# Patient Record
Sex: MALE | Race: WHITE | Employment: FULL TIME | URBAN - METROPOLITAN AREA
[De-identification: names, ages, dates, MRNs, and addresses within clinical notes are randomized per-mention and may not be internally consistent; named-entity substitution may affect disease eponyms.]

---

## 2020-05-04 ENCOUNTER — ANCILLARY PROCEDURE (OUTPATIENT)
Dept: GENERAL RADIOLOGY | Facility: CLINIC | Age: 30
End: 2020-05-04
Attending: PEDIATRICS

## 2020-05-04 ENCOUNTER — OFFICE VISIT (OUTPATIENT)
Dept: ORTHOPEDICS | Facility: CLINIC | Age: 30
End: 2020-05-04

## 2020-05-04 VITALS
HEIGHT: 76 IN | BODY MASS INDEX: 26.79 KG/M2 | WEIGHT: 220 LBS | DIASTOLIC BLOOD PRESSURE: 84 MMHG | SYSTOLIC BLOOD PRESSURE: 118 MMHG

## 2020-05-04 DIAGNOSIS — S52.612A CLOSED DISPLACED FRACTURE OF STYLOID PROCESS OF LEFT ULNA, INITIAL ENCOUNTER: ICD-10-CM

## 2020-05-04 DIAGNOSIS — S69.92XA INJURY OF LEFT WRIST, INITIAL ENCOUNTER: ICD-10-CM

## 2020-05-04 DIAGNOSIS — S69.92XA INJURY OF LEFT WRIST, INITIAL ENCOUNTER: Primary | ICD-10-CM

## 2020-05-04 PROCEDURE — 99203 OFFICE O/P NEW LOW 30 MIN: CPT | Performed by: PEDIATRICS

## 2020-05-04 PROCEDURE — 73110 X-RAY EXAM OF WRIST: CPT | Mod: LT

## 2020-05-04 ASSESSMENT — MIFFLIN-ST. JEOR: SCORE: 2059.41

## 2020-05-04 NOTE — PATIENT INSTRUCTIONS
Icing, over the counter medication, and elevate as needed for comfort  Routine use of support discussed; will use brace  Letter for work  Plan on recheck in approx 4 weeks, sooner if needed  Contact clinic if any questions on insurance coverage; we can consider a care coordination referral

## 2020-05-04 NOTE — PROGRESS NOTES
Sports Medicine Clinic Visit    PCP: No Ref-Primary, Physician    Real Hendricks is a 29 year old male who is seen  as a self referral presenting with a left wrist injury.  While using a wheelbarrow, he jammed his wrist causing it to forcefully ulnarly deviate. 4/29/20  He has continued to have pain down the ulnar aspect of his wrist.   He is right hand dominant.        Injury: forceful ulnar deviation   **  Recently started new job.      Location of Pain: left wrist   Duration of Pain: 5 day(s)  Rating of Pain at worst: 8/10  Rating of Pain Currently: 4/10  Symptoms are better with: Ice and Elevation, compression   Symptoms are worse with: flexion, pronation/supination   Additional Features:   Positive: swelling and popping   Negative: bruising, grinding, catching, locking, instability, paresthesias, numbness, weakness, pain in other joints and systemic symptoms  Other evaluation and/or treatments so far consists of: Nothing  Prior History of related problems: denies     Social History: concrete     Review of Systems  Musculoskeletal: as above  Remainder of review of systems is negative including constitutional, CV, pulmonary, GI, Skin and Neurologic except as noted in HPI or medical history.      PMHx: noncontributory    Past Surgical History:   Procedure Laterality Date     HERNIA REPAIR       Family History   Problem Relation Age of Onset     Allergies Mother      Allergies Father      Osteoarthritis Father      Social History     Socioeconomic History     Marital status: Single     Spouse name: Not on file     Number of children: Not on file     Years of education: Not on file     Highest education level: Not on file   Occupational History     Not on file   Social Needs     Financial resource strain: Not on file     Food insecurity     Worry: Not on file     Inability: Not on file     Transportation needs     Medical: Not on file     Non-medical: Not on file   Tobacco Use     Smoking status: Current Every Day  "Smoker     Smokeless tobacco: Never Used   Substance and Sexual Activity     Alcohol use: Not on file     Drug use: Not on file     Sexual activity: Not on file   Lifestyle     Physical activity     Days per week: Not on file     Minutes per session: Not on file     Stress: Not on file   Relationships     Social connections     Talks on phone: Not on file     Gets together: Not on file     Attends Mandaeism service: Not on file     Active member of club or organization: Not on file     Attends meetings of clubs or organizations: Not on file     Relationship status: Not on file     Intimate partner violence     Fear of current or ex partner: Not on file     Emotionally abused: Not on file     Physically abused: Not on file     Forced sexual activity: Not on file   Other Topics Concern     Not on file   Social History Narrative     Not on file       Objective  /84   Ht 1.93 m (6' 4\")   Wt 99.8 kg (220 lb)   BMI 26.78 kg/m      GENERAL APPEARANCE: healthy, alert and no distress   GAIT: NORMAL  SKIN: no suspicious lesions or rashes  NEURO: Normal strength and tone, mentation intact and speech normal  PSYCH:  mentation appears normal and affect normal/bright  HEENT: no scleral icterus  CV: distal perfusion intact  RESP: nonlabored breathing    Exam:  Hand/wrist (left):    Inspection:  No deformity noted.  Ulnar wrist swelling. No ecchymosis.    Motion:  Forearm pronation full, forearm supination mild limtiation.  Wrist flexion mild limitation  Wrist extension mild limitation  Radial and ulnar deviation min limitation with stiffness  Digit motion full  Primarily some tightness with wrist motion    Strength:  deferred    Sensation:  Grossly intact .    Radial pulses normal, +2/4, capillary refill brisk.    Palpation:  Tender ulnar styloid  Nontender remainder    Skin:       well perfused       capillary refill brisk    Radiology:  Visualized radiographs of left wrist obtained today, and reviewed the images with " the patient.  Impression: ulnar styloid fracture acute, with chronic appearing ossicle.    XR Wrist Left G/E 3 Views    Narrative    WRIST LEFT THREE OR MORE VIEWS May 4, 2020 11:09 AM     HISTORY: Injury of left wrist, initial encounter.      Impression    IMPRESSION: Unusual acute or subacute fracture along the medial margin  of the ulnar styloid base. Small well-corticated ossicle adjacent to  the tip of the ulnar styloid compatible with old injury. The distal  radius appears intact. Radiocarpal and midcarpal joint space widths  appear within normal limits.    ROMMEL ARELLANO MD           Assessment:  1. Injury of left wrist, initial encounter    2. Closed displaced fracture of styloid process of left ulna, initial encounter         Plan:  Discussed the assessment with the patient.  Discussed nature of injury, fracture healing. Interesting appearing acute fracture, with chronic appearing ossicle.  Icing, elevation, OTC meds prn.  Discussed support options, including bracing, casting, custom splint, less likely fracture brace for support. Following discussion of options, he does not want to do casting or custom splint. Also is self pay currently. Will provide wrist brace, anticipate regular use.  Limit activities for pain. Letter provided for work.  Follow up: 4 weeks, repeat x-ray wrist, sooner prn.  Questions answered. The patient indicates understanding of these issues and agrees with the plan.    Stan Varghese, DO, CAQ          Patient Instructions   Icing, over the counter medication, and elevate as needed for comfort  Routine use of support discussed; will use brace  Letter for work  Plan on recheck in approx 4 weeks, sooner if needed  Contact clinic if any questions on insurance coverage; we can consider a care coordination referral       Disclaimer: This note consists of symbols derived from keyboarding, dictation and/or voice recognition software. As a result, there may be errors in the script that  have gone undetected. Please consider this when interpreting information found in this chart.

## 2020-05-04 NOTE — LETTER
[facility] [place]  [address]  [place] [address]  Phone: [phone]  Fax: [phone]      May 4, 2020      RE: Real Hendricks  [address]  [place] [address]        To whom it may concern:    Real Hendricks was seen in clinic today. The employee is ABLE to return to work next scheduled work date.    When the patient returns to work, the following restrictions apply for the next 4 weeks:  Reach Above Shoulders: Not at all (0 hours) left  Lift, carry, push, and pull: avoid independent motion on left; but may do up to 5 lbs left occasionally (1-3 hours) if needed and symptom free.  Keep work close to body with left upper extremity.      Sincerely,            Stan COPELAND.

## 2020-05-04 NOTE — LETTER
5/4/2020         RE: Real Hendricks  [address]  [place] [address]        Dear Colleague,    Thank you for referring your patient, Real Hendricks, to the [facility] [place]. Please see a copy of my visit note below.    Sports Medicine Clinic Visit    PCP: No Ref-Primary, Physician    Real Hendricks is a 29 year old male who is seen  as a self referral presenting with a left wrist injury.  While using a wheelbarrow, he jammed his wrist causing it to forcefully ulnarly deviate. 4/29/20  He has continued to have pain down the ulnar aspect of his wrist.   He is right hand dominant.        Injury: forceful ulnar deviation   **  Recently started new job.      Location of Pain: left wrist   Duration of Pain: 5 day(s)  Rating of Pain at worst: 8/10  Rating of Pain Currently: 4/10  Symptoms are better with: Ice and Elevation, compression   Symptoms are worse with: flexion, pronation/supination   Additional Features:   Positive: swelling and popping   Negative: bruising, grinding, catching, locking, instability, paresthesias, numbness, weakness, pain in other joints and systemic symptoms  Other evaluation and/or treatments so far consists of: Nothing  Prior History of related problems: denies     Social History: concrete     Review of Systems  Musculoskeletal: as above  Remainder of review of systems is negative including constitutional, CV, pulmonary, GI, Skin and Neurologic except as noted in HPI or medical history.      PMHx: noncontributory    Past Surgical History:   Procedure Laterality Date     HERNIA REPAIR       Family History   Problem Relation Age of Onset     Allergies Mother      Allergies Father      Osteoarthritis Father      Social History     Socioeconomic History     Marital status: Single     Spouse name: Not on file     Number of children: Not on file     Years of education: Not on file     Highest education level: Not on file   Occupational History     Not on file   Social  "Needs     Financial resource strain: Not on file     Food insecurity     Worry: Not on file     Inability: Not on file     Transportation needs     Medical: Not on file     Non-medical: Not on file   Tobacco Use     Smoking status: Current Every Day Smoker     Smokeless tobacco: Never Used   Substance and Sexual Activity     Alcohol use: Not on file     Drug use: Not on file     Sexual activity: Not on file   Lifestyle     Physical activity     Days per week: Not on file     Minutes per session: Not on file     Stress: Not on file   Relationships     Social connections     Talks on phone: Not on file     Gets together: Not on file     Attends Sabianism service: Not on file     Active member of club or organization: Not on file     Attends meetings of clubs or organizations: Not on file     Relationship status: Not on file     Intimate partner violence     Fear of current or ex partner: Not on file     Emotionally abused: Not on file     Physically abused: Not on file     Forced sexual activity: Not on file   Other Topics Concern     Not on file   Social History Narrative     Not on file       Objective  /84   Ht 1.93 m (6' 4\")   Wt 99.8 kg (220 lb)   BMI 26.78 kg/m      GENERAL APPEARANCE: healthy, alert and no distress   GAIT: NORMAL  SKIN: no suspicious lesions or rashes  NEURO: Normal strength and tone, mentation intact and speech normal  PSYCH:  mentation appears normal and affect normal/bright  HEENT: no scleral icterus  CV: distal perfusion intact  RESP: nonlabored breathing    Exam:  Hand/wrist (left):    Inspection:  No deformity noted.  Ulnar wrist swelling. No ecchymosis.    Motion:  Forearm pronation full, forearm supination mild limtiation.  Wrist flexion mild limitation  Wrist extension mild limitation  Radial and ulnar deviation min limitation with stiffness  Digit motion full  Primarily some tightness with wrist motion    Strength:  deferred    Sensation:  Grossly intact .    Radial pulses " normal, +2/4, capillary refill brisk.    Palpation:  Tender ulnar styloid  Nontender remainder    Skin:       well perfused       capillary refill brisk    Radiology:  Visualized radiographs of left wrist obtained today, and reviewed the images with the patient.  Impression: ulnar styloid fracture acute, with chronic appearing ossicle.    XR Wrist Left G/E 3 Views    Narrative    WRIST LEFT THREE OR MORE VIEWS May 4, 2020 11:09 AM     HISTORY: Injury of left wrist, initial encounter.      Impression    IMPRESSION: Unusual acute or subacute fracture along the medial margin  of the ulnar styloid base. Small well-corticated ossicle adjacent to  the tip of the ulnar styloid compatible with old injury. The distal  radius appears intact. Radiocarpal and midcarpal joint space widths  appear within normal limits.    ROMMEL ARELLANO MD           Assessment:  1. Injury of left wrist, initial encounter    2. Closed displaced fracture of styloid process of left ulna, initial encounter         Plan:  Discussed the assessment with the patient.  Discussed nature of injury, fracture healing. Interesting appearing acute fracture, with chronic appearing ossicle.  Icing, elevation, OTC meds prn.  Discussed support options, including bracing, casting, custom splint, less likely fracture brace for support. Following discussion of options, he does not want to do casting or custom splint. Also is self pay currently. Will provide wrist brace, anticipate regular use.  Limit activities for pain. Letter provided for work.  Follow up: 4 weeks, repeat x-ray wrist, sooner prn.  Questions answered. The patient indicates understanding of these issues and agrees with the plan.    Stan Varghese, DO, CAQ          Patient Instructions   Icing, over the counter medication, and elevate as needed for comfort  Routine use of support discussed; will use brace  Letter for work  Plan on recheck in approx 4 weeks, sooner if needed  Contact clinic if any  questions on insurance coverage; we can consider a care coordination referral       Disclaimer: This note consists of symbols derived from keyboarding, dictation and/or voice recognition software. As a result, there may be errors in the script that have gone undetected. Please consider this when interpreting information found in this chart.          Again, thank you for allowing me to participate in the care of your patient.        Sincerely,        Stan Varghese, DO

## 2020-05-04 NOTE — NURSING NOTE
Patient was fitted for a left wrist brace.  He was able to demonstrate use and fit. States his wrist had improvement in pain with the use of the brace.  All questions were answered  Michelle Ansari MS ATC

## 2020-06-15 ENCOUNTER — OFFICE VISIT (OUTPATIENT)
Dept: ORTHOPEDICS | Facility: CLINIC | Age: 30
End: 2020-06-15

## 2020-06-15 ENCOUNTER — ANCILLARY PROCEDURE (OUTPATIENT)
Dept: GENERAL RADIOLOGY | Facility: CLINIC | Age: 30
End: 2020-06-15
Attending: PEDIATRICS

## 2020-06-15 VITALS
DIASTOLIC BLOOD PRESSURE: 80 MMHG | WEIGHT: 220 LBS | HEIGHT: 76 IN | BODY MASS INDEX: 26.79 KG/M2 | SYSTOLIC BLOOD PRESSURE: 120 MMHG

## 2020-06-15 DIAGNOSIS — S52.612D CLOSED DISPLACED FRACTURE OF STYLOID PROCESS OF LEFT ULNA WITH ROUTINE HEALING, SUBSEQUENT ENCOUNTER: ICD-10-CM

## 2020-06-15 DIAGNOSIS — S52.612D CLOSED DISPLACED FRACTURE OF STYLOID PROCESS OF LEFT ULNA WITH ROUTINE HEALING, SUBSEQUENT ENCOUNTER: Primary | ICD-10-CM

## 2020-06-15 PROCEDURE — 73110 X-RAY EXAM OF WRIST: CPT | Mod: LT

## 2020-06-15 PROCEDURE — 99213 OFFICE O/P EST LOW 20 MIN: CPT | Performed by: PEDIATRICS

## 2020-06-15 ASSESSMENT — MIFFLIN-ST. JEOR: SCORE: 2059.41

## 2020-06-15 NOTE — LETTER
6/15/2020         RE: Real Hendricks  [address]  [place] [address]        Dear Colleague,    Thank you for referring your patient, Real Hendricks, to the [facility]. Please see a copy of my visit note below.    Sports Medicine Clinic Visit    PCP: No Ref-Primary, Physician    Real Hendricks is a 29 year old male who is seen in f/u up for Closed displaced fracture of styloid process of left ulna with routine healing, subsequent encounter.4/29/20  Now 6.5 weeks out from injury.  Since last visit on 5/4/2020 patient splint removed and repeat radiograph taken prior to seeing the patient.   Has continued with use of the splint.  Does feel there has been improvement. Does still have some soreness with ulnar deviation and wrist extension   **  Movement better than previous. Still some swelling present.  Has been working, though has been limiting heavier lifting.      Review of Systems  All other systems reviewed and are negative unless noted above.      PMHx: noncontributory    Past Surgical History:   Procedure Laterality Date     HERNIA REPAIR       Family History   Problem Relation Age of Onset     Allergies Mother      Allergies Father      Osteoarthritis Father      Social History     Socioeconomic History     Marital status: Single     Spouse name: Not on file     Number of children: Not on file     Years of education: Not on file     Highest education level: Not on file   Occupational History     Not on file   Social Needs     Financial resource strain: Not on file     Food insecurity     Worry: Not on file     Inability: Not on file     Transportation needs     Medical: Not on file     Non-medical: Not on file   Tobacco Use     Smoking status: Current Every Day Smoker     Smokeless tobacco: Never Used   Substance and Sexual Activity     Alcohol use: Not on file     Drug use: Not on file     Sexual activity: Not on file   Lifestyle     Physical activity     Days per week: Not  "on file     Minutes per session: Not on file     Stress: Not on file   Relationships     Social connections     Talks on phone: Not on file     Gets together: Not on file     Attends Hoahaoism service: Not on file     Active member of club or organization: Not on file     Attends meetings of clubs or organizations: Not on file     Relationship status: Not on file     Intimate partner violence     Fear of current or ex partner: Not on file     Emotionally abused: Not on file     Physically abused: Not on file     Forced sexual activity: Not on file   Other Topics Concern     Not on file   Social History Narrative     Not on file         Objective  /80   Ht 1.93 m (6' 4\")   Wt 99.8 kg (220 lb)   BMI 26.78 kg/m      GENERAL APPEARANCE: healthy, alert and no distress   GAIT: NORMAL  SKIN: no suspicious lesions or rashes  NEURO: Normal strength and tone, mentation intact and speech normal  PSYCH:  mentation appears normal and affect normal/bright  HEENT: no scleral icterus  CV: distal perfusion intact  RESP: nonlabored breathing    Exam:  Left wrist:  Mild diffuse swelling remains, more ulnar aspect of wrist.  Regional pulses normal.  Capillary refill less than 2 seconds.  Normal sensation noted.    Lacking ~5-10 deg flexion, extension, some stiffness. Also stiffness sensation radial and ulnar deviation.  Mild tenderness ulnar styloid.    Radiology  Visualized radiographs as noted below, and reviewed the images with the patient; if the report was available at the time of the visit, the report was reviewed as well.  Some improvement in appearance of ulnar styloid fracture, though still apparent.    XR Wrist Left G/E 3 Views    Narrative    XR WRIST LEFT G/E 3 VIEWS 6/15/2020 3:57 PM     HISTORY: Closed displaced fracture of styloid process of left ulna  with routine healing, subsequent encounter    COMPARISON: 5/4/2020       Impression    IMPRESSION: No significant change in appearance of the small  curvilinear " fracture fragment at the periphery of the styloid. No bony  bridging or peripheral calcification formation. Chronic appearing  small subcortical cyst at the ulnar styloid with adjacent ossicle are  unchanged. Joint spaces are preserved.    ANDREA WILSON MD         Assessment:  1. Closed displaced fracture of styloid process of left ulna with routine healing, subsequent encounter        Plan:  Discussed the assessment with the patient.  Clinically improving. Some radiographic improvement as well.  Bracing for comfort, not required for routine use if no pain, though would anticipate will continue with bracing with work.  Updated work letter; will continue with restrictions, given some persistent wrist stiffness.  Follow up: pt to contact clinic with update in 2 weeks, sooner prn. If doing well, then likely continue with monitoring and f/u prn.  Questions answered. The patient indicates understanding of these issues and agrees with the plan.    Stan Varghese, DO, CAQ            Patient Instructions   X-rays show some improvement in the fracture, though it is not completely healed  Continue with activity modification; updated work letter today, in place for an additional 2 weeks  Work on motion exercises, reviewed today  Contact clinic by phone if questions/concerns at any point, or if still with ongoing symptoms in the next 2 weeks      If you have any further questions for your physician or physician s care team you can call [phone] and use option 3 to leave a voice message. Calls received during business hours will be returned same day.        This note consists of symbols derived from keyboarding, dictation and/or voice recognition software. As a result, there may be errors in the script that have gone undetected. Please consider this when interpreting information found in this chart.        Again, thank you for allowing me to participate in the care of your patient.        Sincerely,        Stan Giraldo  Abimael, DO

## 2020-06-15 NOTE — PATIENT INSTRUCTIONS
X-rays show some improvement in the fracture, though it is not completely healed  Continue with activity modification; updated work letter today, in place for an additional 2 weeks  Work on motion exercises, reviewed today  Contact clinic by phone if questions/concerns at any point, or if still with ongoing symptoms in the next 2 weeks      If you have any further questions for your physician or physician s care team you can call [phone] and use option 3 to leave a voice message. Calls received during business hours will be returned same day.

## 2020-06-15 NOTE — LETTER
[facility] [place]  [address]  [place] [address]  Phone: [phone]  Fax: [phone]      Aditi 15, 2020      RE: Real Hendricks  [address]  [place] [address]        To whom it may concern:    Real Hendricks was seen in clinic today. The employee is ABLE to return to work next scheduled work date.    When the patient returns to work, the following restrictions apply for the next 2 weeks:  Continue with previously placed restrictions, to allow for continued healing.  Reach Above Shoulders: Not at all (0 hours) left  Lift, carry, push, and pull: avoid independent motion on left; but may do up to 5 lbs left occasionally (1-3 hours) if needed and symptom free.  Keep work close to body with left upper extremity.    Real can contact the clinic in approximately 2 weeks if any ongoing symptoms or sooner if questions/concerns.      Sincerely,            Stan COPELAND.

## 2020-06-15 NOTE — PROGRESS NOTES
Sports Medicine Clinic Visit    PCP: No Ref-Primary, Physician    Real Eladio is a 29 year old male who is seen in f/u up for Closed displaced fracture of styloid process of left ulna with routine healing, subsequent encounter.4/29/20  Now 6.5 weeks out from injury.  Since last visit on 5/4/2020 patient splint removed and repeat radiograph taken prior to seeing the patient.   Has continued with use of the splint.  Does feel there has been improvement. Does still have some soreness with ulnar deviation and wrist extension   **  Movement better than previous. Still some swelling present.  Has been working, though has been limiting heavier lifting.      Review of Systems  All other systems reviewed and are negative unless noted above.      PMHx: noncontributory    Past Surgical History:   Procedure Laterality Date     HERNIA REPAIR       Family History   Problem Relation Age of Onset     Allergies Mother      Allergies Father      Osteoarthritis Father      Social History     Socioeconomic History     Marital status: Single     Spouse name: Not on file     Number of children: Not on file     Years of education: Not on file     Highest education level: Not on file   Occupational History     Not on file   Social Needs     Financial resource strain: Not on file     Food insecurity     Worry: Not on file     Inability: Not on file     Transportation needs     Medical: Not on file     Non-medical: Not on file   Tobacco Use     Smoking status: Current Every Day Smoker     Smokeless tobacco: Never Used   Substance and Sexual Activity     Alcohol use: Not on file     Drug use: Not on file     Sexual activity: Not on file   Lifestyle     Physical activity     Days per week: Not on file     Minutes per session: Not on file     Stress: Not on file   Relationships     Social connections     Talks on phone: Not on file     Gets together: Not on file     Attends [employer] service: Not on file     Active member of club or  "organization: Not on file     Attends meetings of clubs or organizations: Not on file     Relationship status: Not on file     Intimate partner violence     Fear of current or ex partner: Not on file     Emotionally abused: Not on file     Physically abused: Not on file     Forced sexual activity: Not on file   Other Topics Concern     Not on file   Social History Narrative     Not on file         Objective  /80   Ht 1.93 m (6' 4\")   Wt 99.8 kg (220 lb)   BMI 26.78 kg/m      GENERAL APPEARANCE: healthy, alert and no distress   GAIT: NORMAL  SKIN: no suspicious lesions or rashes  NEURO: Normal strength and tone, mentation intact and speech normal  PSYCH:  mentation appears normal and affect normal/bright  HEENT: no scleral icterus  CV: distal perfusion intact  RESP: nonlabored breathing    Exam:  Left wrist:  Mild diffuse swelling remains, more ulnar aspect of wrist.  Regional pulses normal.  Capillary refill less than 2 seconds.  Normal sensation noted.    Lacking ~5-10 deg flexion, extension, some stiffness. Also stiffness sensation radial and ulnar deviation.  Mild tenderness ulnar styloid.    Radiology  Visualized radiographs as noted below, and reviewed the images with the patient; if the report was available at the time of the visit, the report was reviewed as well.  Some improvement in appearance of ulnar styloid fracture, though still apparent.    XR Wrist Left G/E 3 Views    Narrative    XR WRIST LEFT G/E 3 VIEWS 6/15/2020 3:57 PM     HISTORY: Closed displaced fracture of styloid process of left ulna  with routine healing, subsequent encounter    COMPARISON: 5/4/2020       Impression    IMPRESSION: No significant change in appearance of the small  curvilinear fracture fragment at the periphery of the styloid. No bony  bridging or peripheral calcification formation. Chronic appearing  small subcortical cyst at the ulnar styloid with adjacent ossicle are  unchanged. Joint spaces are " preserved.    ANDREA WILSON MD         Assessment:  1. Closed displaced fracture of styloid process of left ulna with routine healing, subsequent encounter        Plan:  Discussed the assessment with the patient.  Clinically improving. Some radiographic improvement as well.  Bracing for comfort, not required for routine use if no pain, though would anticipate will continue with bracing with work.  Updated work letter; will continue with restrictions, given some persistent wrist stiffness.  Follow up: pt to contact clinic with update in 2 weeks, sooner prn. If doing well, then likely continue with monitoring and f/u prn.  Questions answered. The patient indicates understanding of these issues and agrees with the plan.    Stan Varghese, DO, CAQ            Patient Instructions   X-rays show some improvement in the fracture, though it is not completely healed  Continue with activity modification; updated work letter today, in place for an additional 2 weeks  Work on motion exercises, reviewed today  Contact clinic by phone if questions/concerns at any point, or if still with ongoing symptoms in the next 2 weeks      If you have any further questions for your physician or physician s care team you can call [phone] and use option 3 to leave a voice message. Calls received during business hours will be returned same day.        This note consists of symbols derived from keyboarding, dictation and/or voice recognition software. As a result, there may be errors in the script that have gone undetected. Please consider this when interpreting information found in this chart.